# Patient Record
Sex: FEMALE | Race: WHITE | NOT HISPANIC OR LATINO | Employment: OTHER | URBAN - METROPOLITAN AREA
[De-identification: names, ages, dates, MRNs, and addresses within clinical notes are randomized per-mention and may not be internally consistent; named-entity substitution may affect disease eponyms.]

---

## 2017-01-08 ENCOUNTER — APPOINTMENT (OUTPATIENT)
Dept: PREADMISSION TESTING | Facility: HOSPITAL | Age: 47
End: 2017-01-08

## 2017-01-08 DIAGNOSIS — I49.5 TACHY-BRADY SYNDROME (HCC): ICD-10-CM

## 2017-01-08 LAB
ANION GAP SERPL CALCULATED.3IONS-SCNC: 9 MMOL/L (ref 3–11)
BUN BLD-MCNC: 11 MG/DL (ref 9–23)
BUN/CREAT SERPL: 13.8 (ref 7–25)
CALCIUM SPEC-SCNC: 10.3 MG/DL (ref 8.7–10.4)
CHLORIDE SERPL-SCNC: 97 MMOL/L (ref 99–109)
CO2 SERPL-SCNC: 33 MMOL/L (ref 20–31)
CREAT BLD-MCNC: 0.8 MG/DL (ref 0.6–1.3)
DEPRECATED RDW RBC AUTO: 42.5 FL (ref 37–54)
ERYTHROCYTE [DISTWIDTH] IN BLOOD BY AUTOMATED COUNT: 13.5 % (ref 11.3–14.5)
GFR SERPL CREATININE-BSD FRML MDRD: 77 ML/MIN/1.73
GLUCOSE BLD-MCNC: 106 MG/DL (ref 70–100)
HBA1C MFR BLD: 6.3 % (ref 4.8–5.6)
HCT VFR BLD AUTO: 46.7 % (ref 34.5–44)
HGB BLD-MCNC: 16.8 G/DL (ref 11.5–15.5)
INR PPP: 0.97
MCH RBC QN AUTO: 31.1 PG (ref 27–31)
MCHC RBC AUTO-ENTMCNC: 36 G/DL (ref 32–36)
MCV RBC AUTO: 86.3 FL (ref 80–99)
PLATELET # BLD AUTO: 191 10*3/MM3 (ref 150–450)
PMV BLD AUTO: 11.4 FL (ref 6–12)
POTASSIUM BLD-SCNC: 3.1 MMOL/L (ref 3.5–5.5)
PROTHROMBIN TIME: 10.6 SECONDS (ref 9.6–11.5)
RBC # BLD AUTO: 5.41 10*6/MM3 (ref 3.89–5.14)
SODIUM BLD-SCNC: 139 MMOL/L (ref 132–146)
WBC NRBC COR # BLD: 9.37 10*3/MM3 (ref 3.5–10.8)

## 2017-01-08 PROCEDURE — 85610 PROTHROMBIN TIME: CPT | Performed by: PHYSICIAN ASSISTANT

## 2017-01-08 PROCEDURE — 83036 HEMOGLOBIN GLYCOSYLATED A1C: CPT | Performed by: INTERNAL MEDICINE

## 2017-01-08 PROCEDURE — 36415 COLL VENOUS BLD VENIPUNCTURE: CPT

## 2017-01-08 PROCEDURE — 85027 COMPLETE CBC AUTOMATED: CPT | Performed by: PHYSICIAN ASSISTANT

## 2017-01-08 PROCEDURE — 80048 BASIC METABOLIC PNL TOTAL CA: CPT | Performed by: PHYSICIAN ASSISTANT

## 2017-01-08 RX ORDER — ALBUTEROL SULFATE 90 UG/1
2 AEROSOL, METERED RESPIRATORY (INHALATION) EVERY 4 HOURS PRN
COMMUNITY

## 2017-01-08 NOTE — DISCHARGE INSTRUCTIONS
The following instructions given during Pre Admission Testing visit:    Do not eat or drink anything after MN except for sips of water with your a.m. Prescription meds unless otherwise instructed by your physician.    Glasses and jewelry may be worn, but dentures must be removed prior to cath/procedure.    Leave any items you consider valuable at home.    Family members may wait in [facility] waiting area during procedure.    Bring all medications in their original containers the day of procedure.    Bring photo ID and insurance cards on the day of procedure.    Need to make arrangements for transportation prior to discharge.    The following handouts were given:     Heart Cath pathway (if applicable)   Cardiac Cath booklet published by [facility]    OR appropriate [facility] procedure booklet    If applicable, pt instructed to bring CPAP mask and tubing the day of procedure.

## 2017-01-08 NOTE — PAT
Patient to apply to surgical area (as instructed) the night before procedure and the AM of procedure.

## 2017-01-10 ENCOUNTER — APPOINTMENT (OUTPATIENT)
Dept: GENERAL RADIOLOGY | Facility: HOSPITAL | Age: 47
End: 2017-01-10

## 2017-01-10 PROCEDURE — 71020 HC CHEST PA AND LATERAL: CPT

## 2017-01-12 DIAGNOSIS — I49.5 TACHY-BRADY SYNDROME (HCC): Primary | ICD-10-CM

## 2017-01-19 ENCOUNTER — OFFICE VISIT (OUTPATIENT)
Dept: CARDIOLOGY | Facility: CLINIC | Age: 47
End: 2017-01-19

## 2017-01-19 VITALS — SYSTOLIC BLOOD PRESSURE: 118 MMHG | DIASTOLIC BLOOD PRESSURE: 72 MMHG

## 2017-01-19 DIAGNOSIS — I49.5 TACHY-BRADY SYNDROME (HCC): Primary | ICD-10-CM

## 2017-01-19 PROCEDURE — 99024 POSTOP FOLLOW-UP VISIT: CPT | Performed by: INTERNAL MEDICINE

## 2017-01-19 NOTE — PROGRESS NOTES
WOUND CHECK    2017    Miryam Barboza, : 1970    Tyrone Colón MD    B/P:  (Sitting) 118/72 (Standing)     Pulse:      Patient has fever: [] Temperature if indicated: none    Wound Location: left subclavin    Dressing Removed [x]        Old Dressing Appearance:  Clean, dry []                 Old, bloody drainage [x]                            Moist, serous drainage []                Moist, thick yellow/green drainage []       Wound Appearance: Redness []                  Drainage []                  Culture obtained []        Color:       Consistency:      Amount:         Gloves used, wound cleansed with sterile 4x4 and peroxide [x]       MD notified [] MD orders:     Antibiotic started []  If checked, type   Other:    Appointment for follow-up scheduled for 3 months post procedure [x]    Future Appointments  Date Time Provider Department Center   2017 10:45 AM Tyrone Colón MD [facility] None           Ruddy Castillo RN, 17      MD Signature:______________________________ Completed By/Date:

## 2017-04-20 ENCOUNTER — OFFICE VISIT (OUTPATIENT)
Dept: CARDIOLOGY | Facility: CLINIC | Age: 47
End: 2017-04-20

## 2017-04-20 VITALS
HEART RATE: 85 BPM | SYSTOLIC BLOOD PRESSURE: 100 MMHG | DIASTOLIC BLOOD PRESSURE: 70 MMHG | HEIGHT: 64 IN | BODY MASS INDEX: 37.56 KG/M2 | WEIGHT: 220 LBS

## 2017-04-20 DIAGNOSIS — I48.91 ATRIAL FIBRILLATION AND FLUTTER (HCC): Primary | ICD-10-CM

## 2017-04-20 DIAGNOSIS — I49.5 TACHYCARDIA-BRADYCARDIA SYNDROME (HCC): ICD-10-CM

## 2017-04-20 DIAGNOSIS — I10 ESSENTIAL HYPERTENSION: ICD-10-CM

## 2017-04-20 DIAGNOSIS — I48.92 ATRIAL FIBRILLATION AND FLUTTER (HCC): Primary | ICD-10-CM

## 2017-04-20 DIAGNOSIS — Z72.0 TOBACCO ABUSE: ICD-10-CM

## 2017-04-20 PROCEDURE — 99213 OFFICE O/P EST LOW 20 MIN: CPT | Performed by: INTERNAL MEDICINE

## 2017-04-20 PROCEDURE — 93288 INTERROG EVL PM/LDLS PM IP: CPT | Performed by: INTERNAL MEDICINE

## 2017-04-20 NOTE — PROGRESS NOTES
Miryam Barboza  1970  [phone]      04/20/2017    [facility] CARDIOLOGY     Brittany Lui MD  [address] [id number]    Chief Complaint   Patient presents with   • Atrial Fibrillation   • Slow Heart Rate       Problem List:   Problem List:   1.Afib/ Flutter - Tachy/ Manuel Syndrome      A. Stress Test: 8/7/2012: No ischemia noted on myocardial perfusion imagine. Norm LV systolic function, non diagnostoic EKG secondary to inadequate HR.      b.Echo 7/22/2015: EF 60-65% mild concentric LVH with E to A reversal implying impaired relaxation. LA 3.2      c.7/23/2015 Heart Catheterization: Normal Cors, Normal LV function. Mild pulm HTN,      d.10/31/2016 Event recorder worn for 7 days- Min HR 49, max 208, Avg 74, NSR with 9% Afib/flutter, longest episode was 30 minutes 23 pauses longest 4.9 sec during waking hours.   E. CHADSVASC: 3 - on Eliquis   F. DDD PPM Implant 1/9/2016 [employer] SN [id number]   G. Tikosyn initiation 1/9/2016   2. HTN  3. DM II  4. JEET  5. Arthritis  6. Fibromyalgia   8. Surgical Hx  A. Tonsillectomy  B. Cholecystectomy  C. Right shoulder surgery  D. Hysterectomy     Allergies  Allergies   Allergen Reactions   • Erythromycin Nausea Only   • Sulfa Antibiotics Hives       Current Medications    Current Outpatient Prescriptions:   •  aclidinium bromide (TUDORZA PRESSAIR) 400 MCG/ACT aerosol powder  powder for inhalation, Inhale 1 puff 2 (Two) Times a Day., Disp: , Rfl:   •  albuterol (PROVENTIL HFA;VENTOLIN HFA) 108 (90 BASE) MCG/ACT inhaler, Inhale 2 puffs Every 4 (Four) Hours As Needed for wheezing., Disp: , Rfl:   •  citalopram (CeleXA) 40 MG tablet, Take 40 mg by mouth Daily., Disp: , Rfl:   •  dofetilide (TIKOSYN) 250 MCG capsule, Take 1 capsule by mouth Every 12 (Twelve) Hours., Disp: 60 capsule, Rfl: 6  •  metFORMIN XR (GLUCOPHAGE-XR) 500 MG 24 hr tablet, Take 500 mg by mouth Daily With Breakfast., Disp: , Rfl:   •  Morphine (MSIR) 30 MG  "tablet, Take 30 mg by mouth Every 12 (Twelve) Hours As Needed for severe pain (7-10)., Disp: , Rfl:   •  pantoprazole (PROTONIX) 40 MG EC tablet, Take 40 mg by mouth Daily., Disp: , Rfl:   •  rivaroxaban (XARELTO) 20 MG tablet, Take 20 mg by mouth Daily., Disp: , Rfl:   •  SITagliptin (JANUVIA) 50 MG tablet, Take 100 mg by mouth Daily., Disp: , Rfl:     History of Present Illness   HPI    Pt presents for follow up of atrial fibrillation, flutter, tachy regino syndrome and PM check. Since we last saw the pt, pt denies any AF episodes, SOB, CP, LH, and dizziness. Denies any hospitalizations, ER visits, bleeding, or TIA/CVA symptoms. Overall feels well.     ROS:  General:  + fatigue, No weight gain or loss  Cardiovascular:  Denies CP, PND, syncope, near syncope, edema or palpitations.  Pulmonary:  Denies PIERRE, cough, or wheezing      Vitals:    04/20/17 1050   BP: 100/70   BP Location: Right arm   Pulse: 85   Weight: 220 lb (99.8 kg)   Height: 64\" (162.6 cm)     PE:  General: NAD  Neck: no JVD, no carotid bruits, no TM  Heart RRR, NL S1, S2, S4 present, no rubs, murmurs  Lungs: CTA, no wheezes, rhonchi, or rales  Abd: soft, non-tender, NL BS  Ext: No musculoskeletal deformities, no edema, cyanosis, or clubbing  Psych: normal mood and affect  Skin: Pm site well healed    Diagnostic Data:  Procedures     PM Check: normal function, battery 10-11 years remaining, no atrial fibrillation, QTc 440 ms    1. Atrial fibrillation and flutter    2. Tachycardia-bradycardia syndrome    3. Essential hypertension    4. Tobacco abuse          Plan:  1) PAF/flutter- well controlled on tikosyn  Continue present medications.   2) Anticoagulation  Continue Xarelto  3) HTN- controlled  Wt loss, exercise, salt reduction  4) tobacco abuse- counseled cessation      F/up in 6 months    Scribed for Tyrone Colón MD by Linda Hannah PA-C. 4/20/2017  11:14 AM     ITyrone MD, personally performed the services face to face as " described in this documentation and as scribed by the above named individual in my presence, and it is both accurate and complete.  4/20/2017  11:30 AM

## 2017-06-28 RX ORDER — DOFETILIDE 0.25 MG/1
CAPSULE ORAL
Qty: 60 CAPSULE | Refills: 5 | Status: SHIPPED | OUTPATIENT
Start: 2017-06-28 | End: 2018-01-15 | Stop reason: SDUPTHER

## 2017-08-03 ENCOUNTER — TELEPHONE (OUTPATIENT)
Dept: CARDIOLOGY | Facility: CLINIC | Age: 47
End: 2017-08-03

## 2018-01-15 RX ORDER — DOFETILIDE 0.25 MG/1
CAPSULE ORAL
Qty: 60 CAPSULE | Refills: 6 | Status: SHIPPED | OUTPATIENT
Start: 2018-01-15 | End: 2018-07-13 | Stop reason: SDUPTHER

## 2018-03-27 ENCOUNTER — TELEPHONE (OUTPATIENT)
Dept: CARDIOLOGY | Facility: CLINIC | Age: 48
End: 2018-03-27

## 2018-03-27 NOTE — TELEPHONE ENCOUNTER
Called pt to check on status on [employer] home monitor.  It is not connecting.  Pt was unaware and will check connections.  Also discussed needing an appt with Dr Colón.  She no showed her last appt in Nov.  Sending message to .

## 2018-07-13 RX ORDER — DOFETILIDE 0.25 MG/1
CAPSULE ORAL
Qty: 60 CAPSULE | Refills: 6 | Status: SHIPPED | OUTPATIENT
Start: 2018-07-13 | End: 2019-04-25 | Stop reason: SDUPTHER

## 2018-08-16 ENCOUNTER — OFFICE VISIT (OUTPATIENT)
Dept: CARDIOLOGY | Facility: CLINIC | Age: 48
End: 2018-08-16

## 2018-08-16 VITALS
DIASTOLIC BLOOD PRESSURE: 73 MMHG | BODY MASS INDEX: 42.68 KG/M2 | HEART RATE: 73 BPM | OXYGEN SATURATION: 97 % | SYSTOLIC BLOOD PRESSURE: 121 MMHG | HEIGHT: 64 IN | WEIGHT: 250 LBS

## 2018-08-16 DIAGNOSIS — I10 ESSENTIAL HYPERTENSION: ICD-10-CM

## 2018-08-16 DIAGNOSIS — I48.91 ATRIAL FIBRILLATION AND FLUTTER (HCC): Primary | ICD-10-CM

## 2018-08-16 DIAGNOSIS — I48.92 ATRIAL FIBRILLATION AND FLUTTER (HCC): Primary | ICD-10-CM

## 2018-08-16 PROCEDURE — 99213 OFFICE O/P EST LOW 20 MIN: CPT | Performed by: INTERNAL MEDICINE

## 2018-08-16 PROCEDURE — 93280 PM DEVICE PROGR EVAL DUAL: CPT | Performed by: INTERNAL MEDICINE

## 2018-08-16 RX ORDER — OXYCODONE 18 MG/1
CAPSULE, EXTENDED RELEASE ORAL 2 TIMES DAILY
COMMUNITY
Start: 2018-08-06 | End: 2021-03-18

## 2018-08-16 NOTE — PROGRESS NOTES
Miryam Barboza  1970    There is no work phone number on file.    08/16/2018    [facility] CARDIOLOGY     Brittany Lui MD  [address] [phone]    Chief Complaint   Patient presents with   • Atrial Fibrillation       Problem List:     1.Afib/ Flutter - Tachy/ Manuel Syndrome      A. Stress Test: 8/7/2012: No ischemia noted on myocardial perfusion imagine. Norm LV systolic function, non diagnostoic EKG secondary to inadequate HR.      b.Echo 7/22/2015: EF 60-65% mild concentric LVH with E to A reversal implying impaired relaxation. LA 3.2      c.7/23/2015 Heart Catheterization: Normal Cors, Normal LV function. Mild pulm HTN,      d.10/31/2016 Event recorder worn for 7 days- Min HR 49, max 208, Avg 74, NSR with 9% Afib/flutter, longest episode was 30 minutes 23 pauses longest 4.9 sec during waking hours.   E. CHADSVASC: 3 - on Eliquis   F. DDD PPM Implant 1/9/2016 [employer] SN [id number]   G. Tikosyn initiation 1/9/2016   2. HTN  3. DM II  4. JEET  5. Arthritis  6. Fibromyalgia   8. Surgical Hx  A. Tonsillectomy  B. Cholecystectomy  C. Right shoulder surgery  D. Hysterectomy     Allergies  Allergies   Allergen Reactions   • Erythromycin Nausea Only   • Sulfa Antibiotics Hives       Current Medications    Current Outpatient Prescriptions:   •  aclidinium bromide (TUDORZA PRESSAIR) 400 MCG/ACT aerosol powder  powder for inhalation, Inhale 1 puff 2 (Two) Times a Day., Disp: , Rfl:   •  albuterol (PROVENTIL HFA;VENTOLIN HFA) 108 (90 BASE) MCG/ACT inhaler, Inhale 2 puffs Every 4 (Four) Hours As Needed for wheezing., Disp: , Rfl:   •  citalopram (CeleXA) 40 MG tablet, Take 40 mg by mouth Daily., Disp: , Rfl:   •  dofetilide (TIKOSYN) 250 MCG capsule, TAKE ONE CAPSULE BY MOUTH EVERY TWELVE HOURS, Disp: 60 capsule, Rfl: 6  •  metFORMIN XR (GLUCOPHAGE-XR) 500 MG 24 hr tablet, Take 500 mg by mouth Daily With Breakfast., Disp: , Rfl:   •  pantoprazole (PROTONIX) 40 MG EC  "tablet, Take 40 mg by mouth Daily., Disp: , Rfl:   •  rivaroxaban (XARELTO) 20 MG tablet, Take 20 mg by mouth Daily., Disp: , Rfl:   •  SITagliptin (JANUVIA) 50 MG tablet, Take 100 mg by mouth Daily., Disp: , Rfl:   •  XTAMPZA ER 18 MG capsule extended-release 12 hour , 2 (Two) Times a Day., Disp: , Rfl:     History of Present Illness   HPI    Pt presents for follow up of atrial fibrillation, tachy regino syndrome and PM check. Since we last saw the pt, she had CP and went to the ED last week. She had LHC which was apparently normal. She still has occasional CP and pain down her left arm. She denies any AF episodes, changes in chronic SOB which she attributes to her COPD. She still smokes 1 PPD. No LH, and dizziness. Denies any , bleeding on Xaretlo, or TIA/CVA symptoms. BP is well controlled.     ROS:  General:  + fatigue, - weight gain or loss + depression/anxeity  Cardiovascular:  +CP, - PND, syncope, near syncope, edema or palpitations.  Pulmonary:  Denies PIERRE, cough, or wheezing      Vitals:    08/16/18 1305   BP: 121/73   BP Location: Right arm   Patient Position: Sitting   Pulse: 73   SpO2: 97%   Weight: 113 kg (250 lb)   Height: 162.6 cm (64\")     Body mass index is 42.91 kg/m².  PE:  General: NAD. A & O x 3  Neck: no JVD, no carotid bruits, no TM  Heart RRR, NL S1, S2, S4 present, no rubs, murmurs  Lungs: CTA, no wheezes, rhonchi, or rales  Abd: soft, non-tender, NL BS  Ext: No musculoskeletal deformities, no edema, cyanosis, or clubbing  Psych: normal mood and affect    Diagnostic Data:    PM check: normal function. No afib/flutter. 11 years on battery        ECG 12 Lead  Date/Time: 8/16/2018 1:30 PM  Performed by: CHEL BASILIO  Authorized by: CHEL BASILIO   Comparison: compared with previous ECG from 1/12/2017  Similar to previous ECG  Rhythm: sinus rhythm  BPM: 74              1. Atrial fibrillation and flutter (CMS/HCC)    2. Essential hypertension          Plan:  1) PAF - no recurrences on " Tikosyn  Continue present medications.   2) Anticoagulation  Continue Xarelto  3) Tachy-regino syndrome- normal PM function  4) HTN: controlled.     F/up in 8 months    Scribed for Tyrone Colón MD by Linda Hannah PA-C. 8/16/2018  1:33 PM

## 2018-11-20 ENCOUNTER — TELEPHONE (OUTPATIENT)
Dept: CARDIOLOGY | Facility: CLINIC | Age: 48
End: 2018-11-20

## 2018-11-20 NOTE — TELEPHONE ENCOUNTER
Called pt due to [employer] home monitor didn't send scheduled transmission.  Monitor is connecting.  Called and left message with my name and number for pt to return my call.

## 2018-12-05 ENCOUNTER — CLINICAL SUPPORT NO REQUIREMENTS (OUTPATIENT)
Dept: CARDIOLOGY | Facility: CLINIC | Age: 48
End: 2018-12-05

## 2018-12-05 ENCOUNTER — TELEPHONE (OUTPATIENT)
Dept: CARDIOLOGY | Facility: CLINIC | Age: 48
End: 2018-12-05

## 2018-12-05 DIAGNOSIS — I49.5 TACHYCARDIA-BRADYCARDIA SYNDROME (HCC): ICD-10-CM

## 2018-12-05 PROCEDURE — 93294 REM INTERROG EVL PM/LDLS PM: CPT | Performed by: INTERNAL MEDICINE

## 2018-12-05 PROCEDURE — 93296 REM INTERROG EVL PM/IDS: CPT | Performed by: INTERNAL MEDICINE

## 2019-03-06 ENCOUNTER — CLINICAL SUPPORT NO REQUIREMENTS (OUTPATIENT)
Dept: CARDIOLOGY | Facility: CLINIC | Age: 49
End: 2019-03-06

## 2019-03-06 DIAGNOSIS — I48.92 ATRIAL FIBRILLATION AND FLUTTER (HCC): ICD-10-CM

## 2019-03-06 DIAGNOSIS — I48.91 ATRIAL FIBRILLATION AND FLUTTER (HCC): ICD-10-CM

## 2019-03-06 PROCEDURE — 93294 REM INTERROG EVL PM/LDLS PM: CPT | Performed by: INTERNAL MEDICINE

## 2019-03-06 PROCEDURE — 93296 REM INTERROG EVL PM/IDS: CPT | Performed by: INTERNAL MEDICINE

## 2019-04-25 RX ORDER — DOFETILIDE 0.25 MG/1
CAPSULE ORAL
Qty: 60 CAPSULE | Refills: 6 | Status: SHIPPED | OUTPATIENT
Start: 2019-04-25 | End: 2019-12-02 | Stop reason: SDUPTHER

## 2019-06-08 ENCOUNTER — CLINICAL SUPPORT NO REQUIREMENTS (OUTPATIENT)
Dept: CARDIOLOGY | Facility: CLINIC | Age: 49
End: 2019-06-08

## 2019-06-08 DIAGNOSIS — I48.92 ATRIAL FIBRILLATION AND FLUTTER (HCC): ICD-10-CM

## 2019-06-08 DIAGNOSIS — I49.5 TACHYCARDIA-BRADYCARDIA SYNDROME (HCC): ICD-10-CM

## 2019-06-08 DIAGNOSIS — I48.91 ATRIAL FIBRILLATION AND FLUTTER (HCC): ICD-10-CM

## 2019-06-08 PROCEDURE — 93296 REM INTERROG EVL PM/IDS: CPT | Performed by: INTERNAL MEDICINE

## 2019-06-08 PROCEDURE — 93294 REM INTERROG EVL PM/LDLS PM: CPT | Performed by: INTERNAL MEDICINE

## 2019-08-15 ENCOUNTER — OFFICE VISIT (OUTPATIENT)
Dept: CARDIOLOGY | Facility: CLINIC | Age: 49
End: 2019-08-15

## 2019-08-15 VITALS
HEART RATE: 79 BPM | DIASTOLIC BLOOD PRESSURE: 72 MMHG | HEIGHT: 65 IN | SYSTOLIC BLOOD PRESSURE: 108 MMHG | BODY MASS INDEX: 34.32 KG/M2 | WEIGHT: 206 LBS

## 2019-08-15 DIAGNOSIS — I49.5 TACHYCARDIA-BRADYCARDIA SYNDROME (HCC): ICD-10-CM

## 2019-08-15 DIAGNOSIS — I48.91 ATRIAL FIBRILLATION AND FLUTTER (HCC): Primary | ICD-10-CM

## 2019-08-15 DIAGNOSIS — I48.92 ATRIAL FIBRILLATION AND FLUTTER (HCC): Primary | ICD-10-CM

## 2019-08-15 DIAGNOSIS — I10 ESSENTIAL HYPERTENSION: ICD-10-CM

## 2019-08-15 PROCEDURE — 99213 OFFICE O/P EST LOW 20 MIN: CPT | Performed by: PHYSICIAN ASSISTANT

## 2019-08-15 PROCEDURE — 93280 PM DEVICE PROGR EVAL DUAL: CPT | Performed by: PHYSICIAN ASSISTANT

## 2019-08-15 RX ORDER — FUROSEMIDE 20 MG/1
TABLET ORAL DAILY
COMMUNITY
Start: 2019-07-29

## 2019-08-15 RX ORDER — ESOMEPRAZOLE MAGNESIUM 40 MG/1
40 CAPSULE, DELAYED RELEASE ORAL 2 TIMES DAILY
COMMUNITY

## 2019-08-15 NOTE — PROGRESS NOTES
Miryam Barboza  1970  [phone]    08/15/2019    [facility] CARDIOLOGY     Brittany Lui MD  [address] [phone]    Chief Complaint   Patient presents with   • Atrial Fibrillation       Problem List:   Problem List:     1.Afib/ Flutter - Tachy/ Manuel Syndrome      A. Stress Test: 8/7/2012: No ischemia noted on myocardial perfusion imagine. Norm LV systolic function, non diagnostoic EKG secondary to inadequate HR.      b.Echo 7/22/2015: EF 60-65% mild concentric LVH with E to A reversal implying impaired relaxation. LA 3.2      c.7/23/2015 Heart Catheterization: Normal Cors, Normal LV function. Mild pulm HTN,      d.10/31/2016 Event recorder worn for 7 days- Min HR 49, max 208, Avg 74, NSR with 9% Afib/flutter, longest episode was 30 minutes 23 pauses longest 4.9 sec during waking hours.   E. CHADSVASC: 3 - on Eliquis   F. DDD PPM Implant 1/9/2016 [employer] SN [id number]   G. Tikosyn initiation 1/9/2016   2. HTN  3. DM II  4. JEET  5. Arthritis  6. Fibromyalgia   8. Surgical Hx  A. Tonsillectomy  B. Cholecystectomy  C. Right shoulder surgery  D. Hysterectomy        Allergies  Allergies   Allergen Reactions   • Erythromycin Nausea Only   • Sulfa Antibiotics Hives       Current Medications    Current Outpatient Medications:   •  albuterol (PROVENTIL HFA;VENTOLIN HFA) 108 (90 BASE) MCG/ACT inhaler, Inhale 2 puffs Every 4 (Four) Hours As Needed for wheezing., Disp: , Rfl:   •  citalopram (CeleXA) 40 MG tablet, Take 40 mg by mouth Daily., Disp: , Rfl:   •  dofetilide (TIKOSYN) 250 MCG capsule, TAKE ONE CAPSULE BY MOUTH EVERY TWELVE HOURS, Disp: 60 capsule, Rfl: 6  •  esomeprazole (nexIUM) 40 MG capsule, Take 40 mg by mouth 2 (Two) Times a Day., Disp: , Rfl:   •  furosemide (LASIX) 20 MG tablet, Daily., Disp: , Rfl:   •  metFORMIN XR (GLUCOPHAGE-XR) 500 MG 24 hr tablet, Take 500 mg by mouth Daily With Breakfast., Disp: , Rfl:   •  rivaroxaban (XARELTO) 20 MG  "tablet, Take 20 mg by mouth Daily., Disp: , Rfl:   •  SITagliptin (JANUVIA) 50 MG tablet, Take 100 mg by mouth Daily., Disp: , Rfl:   •  XTAMPZA ER 18 MG capsule extended-release 12 hour , 2 (Two) Times a Day., Disp: , Rfl:     History of Present Illness     Pt presents for follow up of atrial fibrillation, tachy regino syndrome, PM check, HTN. Since we last saw the pt, pt denies any AF episodes, SOB, CP, LH, and dizziness. Denies any hospitalizations, ER visits, bleeding, or TIA/CVA symptoms. Overall feels well. She has lost 40 lbs with diet.    ROS:  General:  Denies fatigue, weight gain + loss (intentional)  Cardiovascular:  Denies CP, PND, syncope, near syncope, edema or palpitations.  Pulmonary:  Denies PIERRE, cough, or wheezing      Vitals:    08/15/19 1300   BP: 108/72   BP Location: Right arm   Patient Position: Sitting   Pulse: 79   Weight: 93.4 kg (206 lb)   Height: 165.1 cm (65\")     Body mass index is 34.28 kg/m².  PE:  General: NAD  Neck: no JVD, no carotid bruits, no TM  Heart RRR, NL S1, S2, S4 present, no rubs, murmurs  Lungs: CTA, no wheezes, rhonchi, or rales  Abd: soft, non-tender, NL BS  Ext: No musculoskeletal deformities, no edema, cyanosis, or clubbing  Psych: normal mood and affect    Diagnostic Data:    PM Manual Interrogation: Normal pacemaker function.  No atrial fibrillation.  11.7 years on battery.      ECG 12 Lead  Date/Time: 8/15/2019 1:10 PM  Performed by: Linda Hannah PA  Authorized by: Linda Hannah PA   Comparison: compared with previous ECG from 8/16/2018  Similar to previous ECG  Comparison to previous ECG: QTc 400 ms  Rhythm: sinus rhythm  BPM: 80              1. Atrial fibrillation and flutter (CMS/HCC)    2. Tachycardia-bradycardia syndrome (CMS/HCC)    3. Essential hypertension          Plan:  1) PAF - no recurrences on Tikosyn  Continue present medications.   2) Anticoagulation  Continue Xarelto  3) Tachy-regino syndrome- normal PM function  4) HTN: controlled.   - " continue efforts at weight loss.         F/up in 6-8 months

## 2019-09-21 ENCOUNTER — CLINICAL SUPPORT NO REQUIREMENTS (OUTPATIENT)
Dept: CARDIOLOGY | Facility: CLINIC | Age: 49
End: 2019-09-21

## 2019-09-21 DIAGNOSIS — I48.92 ATRIAL FIBRILLATION AND FLUTTER (HCC): ICD-10-CM

## 2019-09-21 DIAGNOSIS — I48.91 ATRIAL FIBRILLATION AND FLUTTER (HCC): ICD-10-CM

## 2019-09-21 PROCEDURE — 93296 REM INTERROG EVL PM/IDS: CPT | Performed by: INTERNAL MEDICINE

## 2019-09-21 PROCEDURE — 93294 REM INTERROG EVL PM/LDLS PM: CPT | Performed by: INTERNAL MEDICINE

## 2019-12-02 RX ORDER — DOFETILIDE 0.25 MG/1
CAPSULE ORAL
Qty: 60 CAPSULE | Refills: 6 | Status: SHIPPED | OUTPATIENT
Start: 2019-12-02 | End: 2020-06-30

## 2020-01-20 ENCOUNTER — CLINICAL SUPPORT NO REQUIREMENTS (OUTPATIENT)
Dept: CARDIOLOGY | Facility: CLINIC | Age: 50
End: 2020-01-20

## 2020-01-20 DIAGNOSIS — I48.92 ATRIAL FIBRILLATION AND FLUTTER (HCC): ICD-10-CM

## 2020-01-20 DIAGNOSIS — I48.91 ATRIAL FIBRILLATION AND FLUTTER (HCC): ICD-10-CM

## 2020-01-20 DIAGNOSIS — I49.5 TACHYCARDIA-BRADYCARDIA SYNDROME (HCC): ICD-10-CM

## 2020-01-20 PROCEDURE — 93294 REM INTERROG EVL PM/LDLS PM: CPT | Performed by: INTERNAL MEDICINE

## 2020-01-20 PROCEDURE — 93296 REM INTERROG EVL PM/IDS: CPT | Performed by: INTERNAL MEDICINE

## 2020-04-13 ENCOUNTER — TELEPHONE (OUTPATIENT)
Dept: CARDIOLOGY | Facility: CLINIC | Age: 50
End: 2020-04-13

## 2020-04-13 NOTE — TELEPHONE ENCOUNTER
Called pt to get a remote reading from her [employer] box for her telephone visit later this week with Dr Colón.  Monitor isn't connecting and  said cell adapter is missing.  He is going to have her call me back when she gets home.

## 2020-04-15 ENCOUNTER — TELEPHONE (OUTPATIENT)
Dept: CARDIOLOGY | Facility: CLINIC | Age: 50
End: 2020-04-15

## 2020-04-15 NOTE — TELEPHONE ENCOUNTER
Called pt to see if she found her cell adapter and see if we can get a reading from her monitor for upcoming phone visit.  Left message for pt to return my phone call.

## 2020-05-21 ENCOUNTER — TELEPHONE (OUTPATIENT)
Dept: CARDIOLOGY | Facility: CLINIC | Age: 50
End: 2020-05-21

## 2020-05-21 NOTE — TELEPHONE ENCOUNTER
Called pt due to scheduled [employer] home monitor was not received.  Left message for pt to return my call.

## 2020-06-26 ENCOUNTER — TELEPHONE (OUTPATIENT)
Dept: CARDIOLOGY | Facility: CLINIC | Age: 50
End: 2020-06-26

## 2020-06-26 NOTE — TELEPHONE ENCOUNTER
Jaylin with  Orthopedics is requesting clearance for the patient to have a minor foot surgery. She will need IV sedation or possibly a block.          [facility]  (F)[phone]

## 2020-06-30 RX ORDER — DOFETILIDE 0.25 MG/1
CAPSULE ORAL
Qty: 60 CAPSULE | Refills: 6 | Status: SHIPPED | OUTPATIENT
Start: 2020-06-30 | End: 2021-02-02

## 2020-09-15 ENCOUNTER — TELEPHONE (OUTPATIENT)
Dept: CARDIOLOGY | Facility: CLINIC | Age: 50
End: 2020-09-15

## 2020-09-15 NOTE — TELEPHONE ENCOUNTER
Patient notified and aware that I submitted the PA on Tikosyn to [facility]. It may 24 to 48 hours to process.

## 2020-10-27 ENCOUNTER — TELEPHONE (OUTPATIENT)
Dept: CARDIOLOGY | Facility: CLINIC | Age: 50
End: 2020-10-27

## 2020-10-27 NOTE — TELEPHONE ENCOUNTER
Pt left message stating she had moved and her home monitor got 'busted'. She ask us to order another for her. I returned her call to verify her new address and there was no answer. I left my name and number for there to return my call.

## 2020-11-19 ENCOUNTER — TELEPHONE (OUTPATIENT)
Dept: CARDIOLOGY | Facility: CLINIC | Age: 50
End: 2020-11-19

## 2020-11-19 NOTE — TELEPHONE ENCOUNTER
>>Spoke with patient about missed remote cardiac device transmission. Pt states she's received a new monitor but needs a cell adapter.   >>Will arrange to have a cell adapter sent to patient.

## 2021-02-02 RX ORDER — DOFETILIDE 0.25 MG/1
250 CAPSULE ORAL EVERY 12 HOURS
Qty: 60 CAPSULE | Refills: 1 | Status: SHIPPED | OUTPATIENT
Start: 2021-02-02 | End: 2021-03-29

## 2021-03-09 ENCOUNTER — HOSPITAL ENCOUNTER (OUTPATIENT)
Age: 51
End: 2021-03-09
Payer: MEDICAID

## 2021-03-09 DIAGNOSIS — Z01.818: Primary | ICD-10-CM

## 2021-03-09 DIAGNOSIS — Z11.52: ICD-10-CM

## 2021-03-09 PROCEDURE — U0003 INFECTIOUS AGENT DETECTION BY NUCLEIC ACID (DNA OR RNA); SEVERE ACUTE RESPIRATORY SYNDROME CORONAVIRUS 2 (SARS-COV-2) (CORONAVIRUS DISEASE [COVID-19]), AMPLIFIED PROBE TECHNIQUE, MAKING USE OF HIGH THROUGHPUT TECHNOLOGIES AS DESCRIBED BY CMS-2020-01-R: HCPCS

## 2021-03-18 ENCOUNTER — OFFICE VISIT (OUTPATIENT)
Dept: CARDIOLOGY | Facility: CLINIC | Age: 51
End: 2021-03-18

## 2021-03-18 VITALS
DIASTOLIC BLOOD PRESSURE: 64 MMHG | OXYGEN SATURATION: 97 % | WEIGHT: 221 LBS | SYSTOLIC BLOOD PRESSURE: 122 MMHG | HEIGHT: 65 IN | HEART RATE: 88 BPM | BODY MASS INDEX: 36.82 KG/M2

## 2021-03-18 DIAGNOSIS — I48.91 ATRIAL FIBRILLATION AND FLUTTER (HCC): Primary | ICD-10-CM

## 2021-03-18 DIAGNOSIS — I10 ESSENTIAL HYPERTENSION: ICD-10-CM

## 2021-03-18 DIAGNOSIS — I49.5 TACHYCARDIA-BRADYCARDIA SYNDROME (HCC): ICD-10-CM

## 2021-03-18 DIAGNOSIS — I48.92 ATRIAL FIBRILLATION AND FLUTTER (HCC): Primary | ICD-10-CM

## 2021-03-18 PROCEDURE — 93280 PM DEVICE PROGR EVAL DUAL: CPT | Performed by: INTERNAL MEDICINE

## 2021-03-18 PROCEDURE — 99213 OFFICE O/P EST LOW 20 MIN: CPT | Performed by: INTERNAL MEDICINE

## 2021-03-18 RX ORDER — ERTUGLIFLOZIN 5 MG/1
5 TABLET, FILM COATED ORAL DAILY
COMMUNITY
Start: 2021-02-27

## 2021-03-18 RX ORDER — APIXABAN 5 MG/1
5 TABLET, FILM COATED ORAL 2 TIMES DAILY
COMMUNITY
Start: 2021-03-01 | End: 2022-06-16 | Stop reason: SDUPTHER

## 2021-03-18 RX ORDER — DULOXETIN HYDROCHLORIDE 60 MG/1
60 CAPSULE, DELAYED RELEASE ORAL DAILY
COMMUNITY

## 2021-03-18 RX ORDER — CLONAZEPAM 0.5 MG/1
0.5 TABLET ORAL 2 TIMES DAILY
COMMUNITY
Start: 2021-03-12

## 2021-03-18 RX ORDER — OXYCODONE AND ACETAMINOPHEN 7.5; 325 MG/1; MG/1
1 TABLET ORAL 3 TIMES DAILY PRN
COMMUNITY
Start: 2021-03-08

## 2021-03-18 NOTE — PROGRESS NOTES
Miryam Barboza  1970  Home phone not available    03/18/2021    [facility] CARDIOLOGY     Referring Provider: No ref. provider found     Brittany Lui MD  [address] [place] [id number]    Chief Complaint   Patient presents with   • Atrial Fibrillation       Problem List:     1.Afib/ Flutter - Tachy/ Manuel Syndrome      A. Stress Test: 8/7/2012: No ischemia noted on myocardial perfusion imagine. Norm LV systolic function, non diagnostoic EKG secondary to inadequate HR.      b.Echo 7/22/2015: EF 60-65% mild concentric LVH with E to A reversal implying impaired relaxation. LA 3.2      c.7/23/2015 Heart Catheterization: Normal Cors, Normal LV function. Mild pulm HTN,      d.10/31/2016 Event recorder worn for 7 days- Min HR 49, max 208, Avg 74, NSR with 9% Afib/flutter, longest episode was 30 minutes 23 pauses longest 4.9 sec during waking hours.   E. CHADSVASC: 3 - on Eliquis   F. DDD PPM Implant 1/9/2016 [employer] SN [id number]   G. Tikosyn initiation 1/9/2016   2. HTN  3. DM II  4. JEET  5. Arthritis  6. Fibromyalgia   8. Surgical Hx  A. Tonsillectomy  B. Cholecystectomy  C. Right shoulder surgery  D. Hysterectomy    Allergies  Allergies   Allergen Reactions   • Erythromycin Nausea Only   • Sulfa Antibiotics Hives       Current Medications    Current Outpatient Medications:   •  albuterol (PROVENTIL HFA;VENTOLIN HFA) 108 (90 BASE) MCG/ACT inhaler, Inhale 2 puffs Every 4 (Four) Hours As Needed for wheezing., Disp: , Rfl:   •  clonazePAM (KlonoPIN) 0.5 MG tablet, Take 0.5 mg by mouth 2 (Two) Times a Day., Disp: , Rfl:   •  dofetilide (TIKOSYN) 250 MCG capsule, Take 1 capsule by mouth Every 12 (Twelve) Hours. Must make appt for further refills, Disp: 60 capsule, Rfl: 1  •  DULoxetine (CYMBALTA) 60 MG capsule, Take 60 mg by mouth Daily., Disp: , Rfl:   •  Eliquis 5 MG tablet tablet, Take 5 mg by mouth 2 (Two) Times a Day., Disp: , Rfl:   •  esomeprazole (nexIUM) 40 MG capsule, Take 40  "mg by mouth 2 (Two) Times a Day., Disp: , Rfl:   •  furosemide (LASIX) 20 MG tablet, Daily., Disp: , Rfl:   •  metFORMIN XR (GLUCOPHAGE-XR) 500 MG 24 hr tablet, Take 500 mg by mouth 2 (two) times a day., Disp: , Rfl:   •  oxyCODONE-acetaminophen (PERCOCET) 7.5-325 MG per tablet, Take 1 tablet by mouth 3 (Three) Times a Day As Needed. for pain, Disp: , Rfl:   •  Steglatro 5 MG tablet, Take 5 mg by mouth Daily., Disp: , Rfl:     History of Present Illness     Pt presents for follow up of AF, Tachy-bradycardia syndrome, PM, HTN. Since we last saw the pt, pt denies any AF episodes, SOB, CP, LH, and dizziness, syncope Denies any hospitalizations, ER visits, bleeding issues on Eliquis, or TIA/CVA symptoms. Overall feels well. BP is well controlled. Quit smoking, but using vape.     ROS:  General:  Denies fatigue, weight gain or loss  Cardiovascular:  Denies CP, PND, syncope, near syncope, edema or palpitations.  Pulmonary:  Denies PIERRE, cough, or wheezing      Vitals:    03/18/21 1307   BP: 122/64   BP Location: Right arm   Patient Position: Sitting   Pulse: 88   SpO2: 97%   Weight: 100 kg (221 lb)   Height: 165.1 cm (65\")     Body mass index is 36.78 kg/m².  PE:  General: NAD  Neck: no JVD, no carotid bruits, no TM  Heart RRR, NL S1, S2, S4 present, no rubs, murmurs  Lungs: CTA, no wheezes, rhonchi, or rales  Abd: soft, non-tender, NL BS  Ext: No musculoskeletal deformities, no edema, cyanosis, or clubbing  Psych: normal mood and affect    Diagnostic Data:    PM Manual Interrogation: normal function. No AF.  Less than 1% V paced. 11.4 years on battery.       ECG 12 Lead    Date/Time: 3/18/2021 1:37 PM  Performed by: Tyrone Colón MD  Authorized by: Tyrone Colón MD   Comparison: compared with previous ECG from 8/15/2019  Similar to previous ECG  Rhythm: sinus rhythm  BPM: 83              1. Atrial fibrillation and flutter (CMS/HCC)    2. Tachycardia-bradycardia syndrome (CMS/HCC)    3. Essential hypertension  "         Plan:  1) PAF - no recurrences on Tikosyn.  TC normal today.  Continue present medications.   2) Anticoagulation  Continue Eliquis   3) Tachy-regino syndrome- normal PM function  4) HTN: controlled.   - continue efforts at weight loss. She did stop smoking    F/up in 6 months    Scribed for Tyrone Colón MD by Linda Hannah PA-C. 3/18/2021  13:37 EDT     I, Tyrone Colón MD, personally performed the services described in this documentation as scribed by the above named individual in my presence, and it is both accurate and complete.  3/18/2021  13:57 EDT

## 2021-03-29 RX ORDER — DOFETILIDE 0.25 MG/1
250 CAPSULE ORAL 2 TIMES DAILY
Qty: 60 CAPSULE | Refills: 6 | Status: SHIPPED | OUTPATIENT
Start: 2021-03-29 | End: 2021-10-25

## 2021-05-30 PROCEDURE — 93294 REM INTERROG EVL PM/LDLS PM: CPT | Performed by: INTERNAL MEDICINE

## 2021-05-30 PROCEDURE — 93296 REM INTERROG EVL PM/IDS: CPT | Performed by: INTERNAL MEDICINE

## 2021-08-29 PROCEDURE — 93296 REM INTERROG EVL PM/IDS: CPT | Performed by: INTERNAL MEDICINE

## 2021-08-29 PROCEDURE — 93294 REM INTERROG EVL PM/LDLS PM: CPT | Performed by: INTERNAL MEDICINE

## 2021-09-06 ENCOUNTER — HOSPITAL ENCOUNTER (EMERGENCY)
Age: 51
Discharge: HOME | End: 2021-09-06
Payer: MEDICAID

## 2021-09-06 VITALS
OXYGEN SATURATION: 97 % | TEMPERATURE: 98.06 F | HEART RATE: 93 BPM | RESPIRATION RATE: 18 BRPM | DIASTOLIC BLOOD PRESSURE: 67 MMHG | SYSTOLIC BLOOD PRESSURE: 143 MMHG

## 2021-09-06 VITALS
RESPIRATION RATE: 18 BRPM | SYSTOLIC BLOOD PRESSURE: 143 MMHG | TEMPERATURE: 98.06 F | HEART RATE: 93 BPM | OXYGEN SATURATION: 97 % | DIASTOLIC BLOOD PRESSURE: 67 MMHG

## 2021-09-06 VITALS
DIASTOLIC BLOOD PRESSURE: 67 MMHG | HEART RATE: 93 BPM | TEMPERATURE: 98.1 F | RESPIRATION RATE: 18 BRPM | OXYGEN SATURATION: 97 % | SYSTOLIC BLOOD PRESSURE: 143 MMHG

## 2021-09-06 VITALS — BODY MASS INDEX: 36.8 KG/M2

## 2021-09-06 DIAGNOSIS — Z20.822: ICD-10-CM

## 2021-09-06 DIAGNOSIS — J32.9: Primary | ICD-10-CM

## 2021-09-06 PROCEDURE — 99202 OFFICE O/P NEW SF 15 MIN: CPT

## 2021-09-06 PROCEDURE — U0003 INFECTIOUS AGENT DETECTION BY NUCLEIC ACID (DNA OR RNA); SEVERE ACUTE RESPIRATORY SYNDROME CORONAVIRUS 2 (SARS-COV-2) (CORONAVIRUS DISEASE [COVID-19]), AMPLIFIED PROBE TECHNIQUE, MAKING USE OF HIGH THROUGHPUT TECHNOLOGIES AS DESCRIBED BY CMS-2020-01-R: HCPCS

## 2021-09-06 PROCEDURE — G0463 HOSPITAL OUTPT CLINIC VISIT: HCPCS

## 2021-10-25 RX ORDER — DOFETILIDE 0.25 MG/1
CAPSULE ORAL
Qty: 60 CAPSULE | Refills: 6 | Status: SHIPPED | OUTPATIENT
Start: 2021-10-25 | End: 2022-05-16

## 2021-11-28 PROCEDURE — 93294 REM INTERROG EVL PM/LDLS PM: CPT | Performed by: INTERNAL MEDICINE

## 2021-11-28 PROCEDURE — 93296 REM INTERROG EVL PM/IDS: CPT | Performed by: INTERNAL MEDICINE

## 2022-02-27 PROCEDURE — 93294 REM INTERROG EVL PM/LDLS PM: CPT | Performed by: INTERNAL MEDICINE

## 2022-02-27 PROCEDURE — 93296 REM INTERROG EVL PM/IDS: CPT | Performed by: INTERNAL MEDICINE

## 2022-05-16 RX ORDER — DOFETILIDE 0.25 MG/1
CAPSULE ORAL
Qty: 60 CAPSULE | Refills: 6 | Status: SHIPPED | OUTPATIENT
Start: 2022-05-16 | End: 2022-06-16 | Stop reason: SDUPTHER

## 2022-05-29 PROCEDURE — 93296 REM INTERROG EVL PM/IDS: CPT | Performed by: INTERNAL MEDICINE

## 2022-05-29 PROCEDURE — 93294 REM INTERROG EVL PM/LDLS PM: CPT | Performed by: INTERNAL MEDICINE

## 2022-06-16 ENCOUNTER — OFFICE VISIT (OUTPATIENT)
Dept: CARDIOLOGY | Facility: CLINIC | Age: 52
End: 2022-06-16

## 2022-06-16 VITALS
DIASTOLIC BLOOD PRESSURE: 64 MMHG | BODY MASS INDEX: 34.32 KG/M2 | SYSTOLIC BLOOD PRESSURE: 92 MMHG | HEIGHT: 65 IN | HEART RATE: 84 BPM | OXYGEN SATURATION: 98 % | WEIGHT: 206 LBS

## 2022-06-16 DIAGNOSIS — I49.5 TACHYCARDIA-BRADYCARDIA SYNDROME: ICD-10-CM

## 2022-06-16 DIAGNOSIS — I48.91 ATRIAL FIBRILLATION AND FLUTTER: Primary | ICD-10-CM

## 2022-06-16 DIAGNOSIS — I48.92 ATRIAL FIBRILLATION AND FLUTTER: Primary | ICD-10-CM

## 2022-06-16 DIAGNOSIS — I10 ESSENTIAL HYPERTENSION: ICD-10-CM

## 2022-06-16 PROCEDURE — 99214 OFFICE O/P EST MOD 30 MIN: CPT | Performed by: NURSE PRACTITIONER

## 2022-06-16 PROCEDURE — 93000 ELECTROCARDIOGRAM COMPLETE: CPT | Performed by: NURSE PRACTITIONER

## 2022-06-16 RX ORDER — SEMAGLUTIDE 1.34 MG/ML
INJECTION, SOLUTION SUBCUTANEOUS WEEKLY
COMMUNITY
Start: 2022-05-16

## 2022-06-16 RX ORDER — DIPHENOXYLATE HYDROCHLORIDE AND ATROPINE SULFATE 2.5; .025 MG/1; MG/1
1 TABLET ORAL DAILY
COMMUNITY

## 2022-06-16 RX ORDER — APIXABAN 5 MG/1
5 TABLET, FILM COATED ORAL EVERY 12 HOURS SCHEDULED
Qty: 60 TABLET | Refills: 3 | Status: SHIPPED | OUTPATIENT
Start: 2022-06-16

## 2022-06-16 RX ORDER — DOFETILIDE 0.25 MG/1
250 CAPSULE ORAL 2 TIMES DAILY
Qty: 60 CAPSULE | Refills: 6 | Status: SHIPPED | OUTPATIENT
Start: 2022-06-16 | End: 2022-06-16 | Stop reason: SDUPTHER

## 2022-06-16 RX ORDER — DOFETILIDE 0.25 MG/1
250 CAPSULE ORAL 2 TIMES DAILY
Qty: 60 CAPSULE | Refills: 6 | Status: SHIPPED | OUTPATIENT
Start: 2022-06-16

## 2022-06-16 RX ORDER — APIXABAN 5 MG/1
5 TABLET, FILM COATED ORAL EVERY 12 HOURS SCHEDULED
Qty: 60 TABLET | Refills: 3 | Status: SHIPPED | OUTPATIENT
Start: 2022-06-16 | End: 2022-06-16 | Stop reason: SDUPTHER

## 2022-06-16 NOTE — ADDENDUM NOTE
Addended by: ANN NELSON on: 6/16/2022 12:24 PM     Modules accepted: Orders    
Addended by: ANN NELSON on: 6/16/2022 12:39 PM     Modules accepted: Orders    
Alert and oriented to person, place, time/situation. normal mood and affect. no apparent risk to self or others.

## 2022-06-16 NOTE — PROGRESS NOTES
Miryam Barboza  1970  [phone]    06/16/2022    [facility] CARDIOLOGY     Referring Provider: No ref. provider found     Brittany Lui MD  [address] [place] [id number]    Chief Complaint   Patient presents with   • Atrial Fibrillation       Problem List:   1.Afib/ Flutter - Tachy/ Manuel Syndrome      A. Stress Test: 8/7/2012: No ischemia noted on myocardial perfusion imagine. Norm LV systolic function, non diagnostoic EKG secondary to inadequate HR.      b.Echo 7/22/2015: EF 60-65% mild concentric LVH with E to A reversal implying impaired relaxation. LA 3.2      c.7/23/2015 Heart Catheterization: Normal Cors, Normal LV function. Mild pulm HTN,      d.10/31/2016 Event recorder worn for 7 days- Min HR 49, max 208, Avg 74, NSR with 9% Afib/flutter, longest episode was 30 minutes 23 pauses longest 4.9 sec during waking hours.   E. CHADSVASC: 3 - on Eliquis   F. DDD PPM Implant 1/9/2016 [employer] SN [id number]   G. Tikosyn initiation 1/9/2016   2. HTN  3. DM II  4. JEET  5. Arthritis  6. Fibromyalgia   8. Surgical Hx  A. Tonsillectomy  B. Cholecystectomy  C. Right shoulder surgery  D. Hysterectomy    Allergies  Allergies   Allergen Reactions   • Erythromycin Nausea Only   • Sulfa Antibiotics Hives       Current Medications    Current Outpatient Medications:   •  albuterol (PROVENTIL HFA;VENTOLIN HFA) 108 (90 BASE) MCG/ACT inhaler, Inhale 2 puffs Every 4 (Four) Hours As Needed for wheezing., Disp: , Rfl:   •  clonazePAM (KlonoPIN) 0.5 MG tablet, Take 0.5 mg by mouth 2 (Two) Times a Day., Disp: , Rfl:   •  dofetilide (TIKOSYN) 250 MCG capsule, TAKE ONE CAPSULE BY MOUTH TWICE DAILY, Disp: 60 capsule, Rfl: 6  •  DULoxetine (CYMBALTA) 60 MG capsule, Take 60 mg by mouth Daily., Disp: , Rfl:   •  Eliquis 5 MG tablet tablet, Take 5 mg by mouth 2 (Two) Times a Day., Disp: , Rfl:   •  esomeprazole (nexIUM) 40 MG capsule, Take 40 mg by mouth 2 (Two) Times a Day., Disp: , Rfl:   •   "furosemide (LASIX) 20 MG tablet, Daily., Disp: , Rfl:   •  metFORMIN XR (GLUCOPHAGE-XR) 500 MG 24 hr tablet, Take 500 mg by mouth 2 (two) times a day., Disp: , Rfl:   •  multivitamin (THERAGRAN) tablet tablet, Take 1 tablet by mouth Daily., Disp: , Rfl:   •  oxyCODONE-acetaminophen (PERCOCET) 7.5-325 MG per tablet, Take 1 tablet by mouth 3 (Three) Times a Day As Needed. for pain, Disp: , Rfl:   •  Ozempic, 0.25 or 0.5 MG/DOSE, 2 MG/1.5ML solution pen-injector, 1 (One) Time Per Week., Disp: , Rfl:   •  Steglatro 5 MG tablet, Take 5 mg by mouth Daily., Disp: , Rfl:     History of Present Illness     Pt presents for follow up of AF/Tachy-regino syndrome, PM, HTN . Since we last saw the pt, pt denies any AF episodes, SOB, CP, LH, and dizziness. Denies any hospitalizations, ER visits, bleeding issues on Eliquis, or TIA/CVA symptoms. BP controlled at home.  Overall feels well.    ROS:  General:  Denies fatigue, weight gain or loss  Cardiovascular:  Denies CP, PND, syncope, near syncope, edema or palpitations.  Pulmonary:  Denies PIERRE, cough, or wheezing      Vitals:    06/16/22 1141   BP: 92/64   BP Location: Left arm   Patient Position: Sitting   Pulse: 84   SpO2: 98%   Weight: 93.4 kg (206 lb)   Height: 165.1 cm (65\")     Body mass index is 34.28 kg/m².  PE:  General: NAD  Neck: no JVD, no carotid bruits, no TM  Heart RRR, NL S1, S2, no rubs, murmurs  Lungs: CTA, no wheezes, rhonchi, or rales  Abd: soft, non-tender, NL BS  Ext: No musculoskeletal deformities, no edema, cyanosis, or clubbing  Psych: normal mood and affect    Diagnostic Data:  St bobby ppm interrogation: DDDR @ 60, Ap 1%,  0%, NSR. No AF. 1 episode of ST at 1 am in Sept 2021 (2 minutes long).  9.7-11.5 years on battery.       ECG 12 Lead    Date/Time: 6/16/2022 12:12 PM  Performed by: Kalee Mancia APRN  Authorized by: Kalee Mancia APRN   Comparison: compared with previous ECG from 3/18/2021  Similar to previous ECG  Rhythm: sinus " rhythm  Rate: normal  BPM: 76              1. Atrial fibrillation and flutter (HCC)    2. Tachycardia-bradycardia syndrome (HCC)    3. Essential hypertension          Plan:  1) PAF - no recurrences on Tikosyn. Q Tc normal today.  Continue present medications.   2) Anticoagulation  Continue Eliquis   3) Tachy-regino syndrome- normal PM function  4) HTN: controlled.   - continue efforts at weight loss/smoking cessation.     F/up in 12 months    Electronically signed by CYDNEY Roblero, 06/16/22, 12:18 PM EDT.

## 2022-08-28 PROCEDURE — 93294 REM INTERROG EVL PM/LDLS PM: CPT | Performed by: INTERNAL MEDICINE

## 2022-08-28 PROCEDURE — 93296 REM INTERROG EVL PM/IDS: CPT | Performed by: INTERNAL MEDICINE

## 2022-11-27 PROCEDURE — 93294 REM INTERROG EVL PM/LDLS PM: CPT | Performed by: INTERNAL MEDICINE

## 2022-11-27 PROCEDURE — 93296 REM INTERROG EVL PM/IDS: CPT | Performed by: INTERNAL MEDICINE

## 2023-02-26 PROCEDURE — 93294 REM INTERROG EVL PM/LDLS PM: CPT | Performed by: INTERNAL MEDICINE

## 2023-02-26 PROCEDURE — 93296 REM INTERROG EVL PM/IDS: CPT | Performed by: INTERNAL MEDICINE

## 2023-06-19 RX ORDER — DOFETILIDE 0.25 MG/1
250 CAPSULE ORAL 2 TIMES DAILY
Qty: 60 CAPSULE | Refills: 1 | Status: SHIPPED | OUTPATIENT
Start: 2023-06-19

## 2023-07-25 RX ORDER — DOFETILIDE 0.25 MG/1
CAPSULE ORAL
Qty: 60 CAPSULE | Refills: 1 | OUTPATIENT
Start: 2023-07-25

## 2023-07-27 ENCOUNTER — TELEPHONE (OUTPATIENT)
Dept: CARDIOLOGY | Facility: CLINIC | Age: 53
End: 2023-07-27
Payer: COMMERCIAL

## 2023-07-27 NOTE — TELEPHONE ENCOUNTER
Caller: Lynn Barboza    Relationship: Self    Best call back number: [phone]    What is the best time to reach you: ANYTIME    Do you know the name of the person who called: LYNN BARBOZA    What was the call regarding: PATIENT MISSED A CALL ON 07.27.23. THERE IS NO NOTE IN THE CHART.

## 2023-07-27 NOTE — TELEPHONE ENCOUNTER
I called and let the patient know that we did not try to call her. She was unsure who called. She said that they did not leave a message.

## 2023-08-22 RX ORDER — DOFETILIDE 0.25 MG/1
CAPSULE ORAL
Qty: 60 CAPSULE | Refills: 1 | Status: SHIPPED | OUTPATIENT
Start: 2023-08-22

## 2023-08-22 RX ORDER — APIXABAN 5 MG/1
5 TABLET, FILM COATED ORAL EVERY 12 HOURS SCHEDULED
Qty: 60 TABLET | Refills: 1 | Status: SHIPPED | OUTPATIENT
Start: 2023-08-22

## 2023-08-27 PROCEDURE — 93296 REM INTERROG EVL PM/IDS: CPT | Performed by: INTERNAL MEDICINE

## 2023-08-27 PROCEDURE — 93294 REM INTERROG EVL PM/LDLS PM: CPT | Performed by: INTERNAL MEDICINE

## 2023-10-19 RX ORDER — DOFETILIDE 0.25 MG/1
CAPSULE ORAL
Qty: 60 CAPSULE | Refills: 6 | Status: SHIPPED | OUTPATIENT
Start: 2023-10-19

## 2023-10-19 RX ORDER — APIXABAN 5 MG/1
5 TABLET, FILM COATED ORAL EVERY 12 HOURS SCHEDULED
Qty: 60 TABLET | Refills: 6 | Status: SHIPPED | OUTPATIENT
Start: 2023-10-19

## 2023-11-03 RX ORDER — DOFETILIDE 0.25 MG/1
250 CAPSULE ORAL 2 TIMES DAILY
Qty: 60 CAPSULE | Refills: 6 | Status: SHIPPED | OUTPATIENT
Start: 2023-11-03

## 2023-11-03 NOTE — TELEPHONE ENCOUNTER
Caller: Miryam Barboza    Relationship: Self    Best call back number: [phone] (home)     Requested Prescriptions:   Requested Prescriptions     Pending Prescriptions Disp Refills    dofetilide (TIKOSYN) 250 MCG capsule 60 capsule 6     Sig: Take 1 capsule by mouth 2 (Two) Times a Day.        Pharmacy where request should be sent: [address] - [phone]  - [phone] FX     Last office visit with prescribing clinician: 6/16/2022   Last telemedicine visit with prescribing clinician: Visit date not found   Next office visit with prescribing clinician: 1/18/2024     Additional details provided by patient: PATIENT HAS 3 PILLS LEFT     Does the patient have less than a 3 day supply:  [x] Yes  [] No    Would you like a call back once the refill request has been completed: [] Yes [] No    If the office needs to give you a call back, can they leave a voicemail: [] Yes [] No    Richard Koroma Rep   11/03/23 11:37 EDT

## 2024-01-18 ENCOUNTER — OFFICE VISIT (OUTPATIENT)
Dept: CARDIOLOGY | Facility: CLINIC | Age: 54
End: 2024-01-18
Payer: COMMERCIAL

## 2024-01-18 VITALS
BODY MASS INDEX: 29.49 KG/M2 | DIASTOLIC BLOOD PRESSURE: 64 MMHG | HEART RATE: 76 BPM | SYSTOLIC BLOOD PRESSURE: 140 MMHG | OXYGEN SATURATION: 98 % | WEIGHT: 177 LBS | HEIGHT: 65 IN

## 2024-01-18 DIAGNOSIS — I49.5 TACHYCARDIA-BRADYCARDIA SYNDROME: ICD-10-CM

## 2024-01-18 DIAGNOSIS — I48.91 ATRIAL FIBRILLATION AND FLUTTER: Primary | ICD-10-CM

## 2024-01-18 DIAGNOSIS — I49.5 SSS (SICK SINUS SYNDROME): ICD-10-CM

## 2024-01-18 DIAGNOSIS — I48.92 ATRIAL FIBRILLATION AND FLUTTER: Primary | ICD-10-CM

## 2024-01-18 DIAGNOSIS — I10 PRIMARY HYPERTENSION: ICD-10-CM

## 2024-01-18 NOTE — PROGRESS NOTES
Miryam Barboza  1970  Home phone not available    01/18/2024    [facility] CARDIOLOGY     Referring Provider: No ref. provider found     Brittany Lui MD  [address] [facility] [address]    Chief Complaint   Patient presents with    Atrial fibrillation and flutter       Problem List:     1.Afib/ Flutter - Tachy/ Manuel Syndrome      A. Stress Test: 8/7/2012: No ischemia noted on myocardial perfusion imagine. Norm LV systolic function, non diagnostoic EKG secondary to inadequate HR.      b.Echo 7/22/2015: EF 60-65% mild concentric LVH with E to A reversal implying impaired relaxation. LA 3.2      c.7/23/2015 Heart Catheterization: Normal Cors, Normal LV function. Mild pulm HTN,      d.10/31/2016 Event recorder worn for 7 days- Min HR 49, max 208, Avg 74, NSR with 9% Afib/flutter, longest episode was 30 minutes 23 pauses longest 4.9 sec during waking hours.   E. CHADSVASC: 3 - on Eliquis   F. DDD PPM Implant 1/9/2016 [employer] SN [id number]   G. Tikosyn initiation 1/9/2016   2. HTN  3. DM II  4. JEET  5. Arthritis  6. Fibromyalgia   8. Surgical Hx  A. Tonsillectomy  B. Cholecystectomy  C. Right shoulder surgery  D. Hysterectomy      Allergies  Allergies   Allergen Reactions    Erythromycin Nausea Only    Sulfa Antibiotics Hives       Current Medications    Current Outpatient Medications:     clonazePAM (KlonoPIN) 0.5 MG tablet, Take 1 tablet by mouth 2 (Two) Times a Day., Disp: , Rfl:     dofetilide (TIKOSYN) 250 MCG capsule, Take 1 capsule by mouth 2 (Two) Times a Day., Disp: 60 capsule, Rfl: 6    DULoxetine (CYMBALTA) 60 MG capsule, Take 1 capsule by mouth Daily., Disp: , Rfl:     Eliquis 5 MG tablet tablet, Take 1 tablet by mouth Every 12 (Twelve) Hours., Disp: 60 tablet, Rfl: 6    esomeprazole (nexIUM) 40 MG capsule, Take 1 capsule by mouth 2 (Two) Times a Day., Disp: , Rfl:     furosemide (LASIX) 20 MG tablet, Daily., Disp: , Rfl:     metFORMIN XR (GLUCOPHAGE-XR) 500 MG 24 hr  "tablet, Take 1 tablet by mouth 2 (two) times a day., Disp: , Rfl:     oxyCODONE-acetaminophen (PERCOCET) 7.5-325 MG per tablet, Take 1 tablet by mouth 3 (Three) Times a Day As Needed. for pain, Disp: , Rfl:     Ozempic, 0.25 or 0.5 MG/DOSE, 2 MG/1.5ML solution pen-injector, 1 (One) Time Per Week., Disp: , Rfl:     Steglatro 5 MG tablet, Take 1 tablet by mouth Daily., Disp: , Rfl:     History of Present Illness     Pt presents for follow up of PAF/SSS/HTN. Since we last saw the pt, pt denies any AF episodes, SOB, CP, LH, and dizziness. Denies any hospitalizations, ER visits, bleeding, or TIA/CVA symptoms. Overall feels well.  Pressures been well-controlled.  She lost approximately 60 pounds on Ozempic and doing well.  Energy has improved.  Will dealing with fibromyalgia and arthritis pain.          Vitals:    01/18/24 1204   BP: 140/64   BP Location: Right arm   Patient Position: Sitting   Pulse: 76   SpO2: 98%   Weight: 80.3 kg (177 lb)   Height: 165.1 cm (65\")     Body mass index is 29.45 kg/m².  PE:  General: NAD  Neck: no JVD, no carotid bruits, no TM  Heart RRR, NL S1, S2, S4 present, no rubs, murmurs  Lungs: CTA, no wheezes, rhonchi, or rales  Abd: soft, non-tender, NL BS  Ext: No musculoskeletal deformities, no edema, cyanosis, or clubbing  Psych: normal mood and affect    Diagnostic Data:    St bobby ppm interrogation: DDDR @ 60, Ap 2%,  0%, NSR. No AF.  SVT for 2 minutes.  Battery voltage is 5 years.  Disabled RV auto capture.  Chronic thresholds were adjusted for today.      ECG 12 Lead    Date/Time: 1/18/2024 12:13 PM  Performed by: Tyrone Colón MD    Authorized by: Tyrone Colón MD  Comparison: compared with previous ECG from 6/16/2022  Similar to previous ECG  Rhythm: sinus rhythm  BPM: 75               1. Atrial fibrillation and flutter    2. Tachycardia-bradycardia syndrome    3. SSS (sick sinus syndrome)    4. Primary hypertension          Plan:    1) PAF - no recurrences on Tikosyn. QTc " normal today.  Continue present medications.   2) Anticoagulation  Continue Eliquis   3) SSS/Tachy-regino syndrome- normal PM function  4) HTN: controlled..  Excellent weight loss on Ozempic.      F/up with Dr. Lopez in 6 months for twelve-lead EKG and with us in 12 months.

## 2024-06-13 RX ORDER — APIXABAN 5 MG/1
5 TABLET, FILM COATED ORAL EVERY 12 HOURS
Qty: 60 TABLET | Refills: 6 | Status: SHIPPED | OUTPATIENT
Start: 2024-06-13

## 2024-07-15 ENCOUNTER — TELEPHONE (OUTPATIENT)
Dept: CARDIOLOGY | Facility: CLINIC | Age: 54
End: 2024-07-15
Payer: COMMERCIAL

## 2024-07-15 NOTE — TELEPHONE ENCOUNTER
Pt called to report that occasionally she feels a vibration sensation from her pacemaker implant site the will radiate into her left breast. Pt has an [employer] [place] dual chamber pacemaker. Assisted pt w/manual device transmission.     Normal device function, no alerts, no arrhythmias. Notified Pt & reassured pt her device is functioning normally.

## 2024-11-08 RX ORDER — DOFETILIDE 0.25 MG/1
250 CAPSULE ORAL 2 TIMES DAILY
Qty: 60 CAPSULE | Refills: 6 | Status: SHIPPED | OUTPATIENT
Start: 2024-11-08

## 2025-01-07 RX ORDER — APIXABAN 5 MG/1
5 TABLET, FILM COATED ORAL
Qty: 60 TABLET | Refills: 6 | Status: SHIPPED | OUTPATIENT
Start: 2025-01-07

## 2025-06-05 ENCOUNTER — TELEPHONE (OUTPATIENT)
Dept: CARDIOLOGY | Facility: CLINIC | Age: 55
End: 2025-06-05
Payer: COMMERCIAL

## 2025-06-05 RX ORDER — DOFETILIDE 0.25 MG/1
250 CAPSULE ORAL 2 TIMES DAILY
Qty: 60 CAPSULE | Refills: 0 | Status: SHIPPED | OUTPATIENT
Start: 2025-06-05

## 2025-06-05 NOTE — TELEPHONE ENCOUNTER
Caller: [place]    Relationship: PHARMACY [facility]     Best call back number: [phone]    Requested Prescriptions:   Requested Prescriptions     Pending Prescriptions Disp Refills    dofetilide (TIKOSYN) 250 MCG capsule 60 capsule 6     Sig: Take 1 capsule by mouth 2 (Two) Times a Day.        Pharmacy where request should be sent: [address] - [phone]  - [phone] FX     Last office visit with prescribing clinician: 1/18/2024   Last telemedicine visit with prescribing clinician: Visit date not found   Next office visit with prescribing clinician: 7/17/2025     Additional details provided by patient:  PT IS OUT OF MEDICATION AND REFILLS    Does the patient have less than a 3 day supply:  [x] Yes  [] No    Would you like a call back once the refill request has been completed: [x] Yes [] No    If the office needs to give you a call back, can they leave a voicemail: [x] Yes [] No    Michelle MayRichard Rep   06/05/25 14:10 EDT

## 2025-07-09 RX ORDER — DOFETILIDE 0.25 MG/1
250 CAPSULE ORAL 2 TIMES DAILY
Qty: 60 CAPSULE | Refills: 0 | Status: SHIPPED | OUTPATIENT
Start: 2025-07-09

## 2025-07-17 ENCOUNTER — OFFICE VISIT (OUTPATIENT)
Dept: CARDIOLOGY | Facility: CLINIC | Age: 55
End: 2025-07-17
Payer: COMMERCIAL

## 2025-07-17 VITALS
HEART RATE: 76 BPM | BODY MASS INDEX: 28.82 KG/M2 | OXYGEN SATURATION: 99 % | WEIGHT: 173 LBS | DIASTOLIC BLOOD PRESSURE: 76 MMHG | HEIGHT: 65 IN | SYSTOLIC BLOOD PRESSURE: 110 MMHG

## 2025-07-17 DIAGNOSIS — I48.92 ATRIAL FIBRILLATION AND FLUTTER: Primary | ICD-10-CM

## 2025-07-17 DIAGNOSIS — I48.91 ATRIAL FIBRILLATION AND FLUTTER: Primary | ICD-10-CM

## 2025-07-17 DIAGNOSIS — I10 ESSENTIAL HYPERTENSION: ICD-10-CM

## 2025-07-17 DIAGNOSIS — I49.5 TACHYCARDIA-BRADYCARDIA SYNDROME: ICD-10-CM

## 2025-07-17 RX ORDER — CYCLOBENZAPRINE HCL 5 MG
5 TABLET ORAL NIGHTLY PRN
COMMUNITY

## 2025-07-17 RX ORDER — DOFETILIDE 0.25 MG/1
250 CAPSULE ORAL 2 TIMES DAILY
Qty: 60 CAPSULE | Refills: 6 | Status: SHIPPED | OUTPATIENT
Start: 2025-07-17

## 2025-07-17 NOTE — PROGRESS NOTES
6Mary D Barboza  1970  [phone]    07/17/2025    [facility] CARDIOLOGY     Referring Provider: No ref. provider found     Brittany Lui MD  [address]  [facility] [place] [address]    Chief Complaint   Patient presents with    Atrial fibrillation and flutte       Problem List:      1.Afib/ Flutter - Tachy/ Manuel Syndrome      A. Stress Test: 8/7/2012: No ischemia noted on myocardial perfusion imagine. Norm LV systolic function, non diagnostoic EKG secondary to inadequate HR.      b.Echo 7/22/2015: EF 60-65% mild concentric LVH with E to A reversal implying impaired relaxation. LA 3.2      c.7/23/2015 Heart Catheterization: Normal Cors, Normal LV function. Mild pulm HTN,      d.10/31/2016 Event recorder worn for 7 days- Min HR 49, max 208, Avg 74, NSR with 9% Afib/flutter, longest episode was 30 minutes 23 pauses longest 4.9 sec during waking hours.   E. CHADSVASC: 3 - on Eliquis   F. DDD PPM Implant 1/9/2016 [employer] SN [id number]   G. Tikosyn initiation 1/9/2016   2. HTN  3. DM II  4. JEET  5. Arthritis  6. Fibromyalgia   8. Surgical Hx  A. Tonsillectomy  B. Cholecystectomy  C. Right shoulder surgery  D. Hysterectomy         Allergies  Allergies   Allergen Reactions    Erythromycin Nausea Only    Sulfa Antibiotics Hives       Current Medications    Current Outpatient Medications:     cyclobenzaprine (FLEXERIL) 5 MG tablet, Take 1 tablet by mouth At Night As Needed for Muscle Spasms., Disp: , Rfl:     dofetilide (TIKOSYN) 250 MCG capsule, Take 1 capsule by mouth 2 (Two) Times a Day., Disp: 60 capsule, Rfl: 6    DULoxetine (CYMBALTA) 60 MG capsule, Take 1 capsule by mouth Daily., Disp: , Rfl:     Eliquis 5 MG tablet tablet, Take ONE tablet by MOUTH EVERY TWELVE HOURS, Disp: 60 tablet, Rfl: 6    esomeprazole (nexIUM) 40 MG capsule, Take 1 capsule by mouth 2 (Two) Times a Day., Disp: , Rfl:     furosemide (LASIX) 20 MG tablet, Daily., Disp: , Rfl:     metFORMIN XR (GLUCOPHAGE-XR) 500 MG 24  "hr tablet, Take 1 tablet by mouth 2 (two) times a day., Disp: , Rfl:     oxyCODONE-acetaminophen (PERCOCET) 7.5-325 MG per tablet, Take 1 tablet by mouth 3 (Three) Times a Day As Needed. for pain, Disp: , Rfl:     Ozempic, 0.25 or 0.5 MG/DOSE, 2 MG/1.5ML solution pen-injector, 1 (One) Time Per Week., Disp: , Rfl:     Steglatro 5 MG tablet, Take 1 tablet by mouth Daily., Disp: , Rfl:     History of Present Illness:     Pt presents for follow up of PAF/SSS/HTN. Since we last saw the pt, pt denies any AF episodes, SOB, CP, LH, and dizziness, syncope. Denies any hospitalizations, ER visits, bleeding issues on Eliquis, or TIA/CVA symptoms. Overall feels well. BP is well controlled.           Vitals:    07/17/25 1241   BP: 110/76   BP Location: Right arm   Patient Position: Sitting   Pulse: 76   SpO2: 99%   Weight: 78.5 kg (173 lb)   Height: 165.1 cm (65\")     Body mass index is 28.79 kg/m².  PE:  General: NAD. A & O x 3   Neck: no JVD, no carotid bruits, no TM  Heart RRR, NL S1, S2, S4 present, no rubs, murmurs  Lungs: CTA, no wheezes, rhonchi, or rales  Abd: soft, non-tender, NL BS  Ext: No musculoskeletal deformities, no edema, cyanosis, or clubbing  Psych: normal mood and affect    Diagnostic Data:    PM Manual Interrogation: RA paced less than 1%. RV paced 1%. Battery 3.5-4 years on battery. Less than 1% mode switch. NO VT.         ECG 12 Lead    Date/Time: 7/17/2025 1:27 PM  Performed by: Linda Hannah PA    Authorized by: Linda Hannah PA  Comparison: compared with previous ECG from 1/18/2024  Similar to previous ECG  Rhythm: sinus rhythm  BPM: 78                 1. Atrial fibrillation and flutter    2. Tachycardia-bradycardia syndrome    3. Essential hypertension          Plan:  1) PAF - no recurrences on Tikosyn. QTc normal today.  Continue present medications.   2) Anticoagulation  Continue Eliquis   3) SSS/Tachy-regino syndrome- normal PM function  4) HTN: controlled..  Excellent weight loss on " Ozempic    F/up in 6 months